# Patient Record
Sex: FEMALE | Race: WHITE | HISPANIC OR LATINO | Employment: FULL TIME | URBAN - METROPOLITAN AREA
[De-identification: names, ages, dates, MRNs, and addresses within clinical notes are randomized per-mention and may not be internally consistent; named-entity substitution may affect disease eponyms.]

---

## 2018-05-20 ENCOUNTER — HOSPITAL ENCOUNTER (EMERGENCY)
Facility: HOSPITAL | Age: 28
Discharge: HOME | End: 2018-05-20
Attending: EMERGENCY MEDICINE

## 2018-05-20 VITALS
OXYGEN SATURATION: 98 % | HEART RATE: 90 BPM | HEIGHT: 68 IN | WEIGHT: 210 LBS | RESPIRATION RATE: 18 BRPM | DIASTOLIC BLOOD PRESSURE: 86 MMHG | TEMPERATURE: 99 F | BODY MASS INDEX: 31.83 KG/M2 | SYSTOLIC BLOOD PRESSURE: 138 MMHG

## 2018-05-20 DIAGNOSIS — J02.9 VIRAL PHARYNGITIS: ICD-10-CM

## 2018-05-20 DIAGNOSIS — K12.1 MOUTH ULCERS: Primary | ICD-10-CM

## 2018-05-20 LAB
B-HCG UR QL: NEGATIVE
CTP QC/QA: YES
DEPRECATED S PYO AG THROAT QL EIA: NEGATIVE

## 2018-05-20 PROCEDURE — 99283 EMERGENCY DEPT VISIT LOW MDM: CPT

## 2018-05-20 PROCEDURE — 87880 STREP A ASSAY W/OPTIC: CPT

## 2018-05-20 PROCEDURE — 81025 URINE PREGNANCY TEST: CPT | Performed by: NURSE PRACTITIONER

## 2018-05-20 PROCEDURE — 87081 CULTURE SCREEN ONLY: CPT

## 2018-05-20 PROCEDURE — 25000003 PHARM REV CODE 250: Performed by: NURSE PRACTITIONER

## 2018-05-20 RX ORDER — ACETAMINOPHEN 325 MG/1
650 TABLET ORAL
Status: COMPLETED | OUTPATIENT
Start: 2018-05-20 | End: 2018-05-20

## 2018-05-20 RX ADMIN — ACETAMINOPHEN 650 MG: 325 TABLET ORAL at 03:05

## 2018-05-20 RX ADMIN — LIDOCAINE HYDROCHLORIDE 15 ML: 20 SOLUTION ORAL; TOPICAL at 03:05

## 2018-05-20 NOTE — ED NOTES
I have reviewed the provider's instructions with the patient, answering all questions to her satisfaction

## 2018-05-20 NOTE — DISCHARGE INSTRUCTIONS
Please return to the Emergency Department for any new or worsening symptoms including: difficulty or unable to swallow, fever, chest pain, shortness of breath, loss of consciousness, dizziness, weakness, or any other concerns.     Please follow up with your Primary Care Provider within in the week. If you do not have one, you may contact the one listed on your discharge paperwork or you may also call the [facility] Appointment Desk at [phone] to schedule an appointment with one.     Please take all medication as prescribed. Tylenol or Ibuprofen as needed for pain.

## 2018-05-20 NOTE — ED PROVIDER NOTES
Encounter Date: 5/20/2018    SCRIBE #1 NOTE: I, Tammy Bentley, am scribing for, and in the presence of,  SERG Shepherd. I have scribed the following portions of the note - Other sections scribed: ROS and HPI.       History     Chief Complaint   Patient presents with    Sore     pt. states that she has been having mouth sores for that pass 3 days. noted inside of pt. right bottom gum are small sores. pt. c/o pain to her right cheek and mouth     CC: Mouth Sores    HPI: This 27 y.o. female with a PMHx of Sinusitis, presents to the ED complaining of acute onset of painful sores to her mouth and sore throat  for last 3 days.  Denies cough or any other associated sx. No known sick exposures. Took Advil for her sx. No prior similar episodes. Sx are moderate and constant.       The history is provided by the patient. No  was used.     Review of patient's allergies indicates:  No Known Allergies  History reviewed. No pertinent past medical history.  History reviewed. No pertinent surgical history.  History reviewed. No pertinent family history.  Social History   Substance Use Topics    Smoking status: Never Smoker    Smokeless tobacco: Never Used    Alcohol use No     Review of Systems   Constitutional: Negative for fever.   HENT: Positive for mouth sores (R side) and sore throat. Negative for congestion, drooling and trouble swallowing.    Respiratory: Negative for cough and shortness of breath.    Cardiovascular: Negative for chest pain.   Gastrointestinal: Negative for abdominal pain, nausea and vomiting.   Musculoskeletal: Negative for neck pain and neck stiffness.   Skin: Negative for rash and wound.   Psychiatric/Behavioral: Negative for confusion.       Physical Exam     Initial Vitals [05/20/18 1422]   BP Pulse Resp Temp SpO2   (!) 143/95 98 18 98.6 °F (37 °C) 100 %      MAP       111         Physical Exam    Nursing note and vitals reviewed.  Constitutional: She appears well-developed and  well-nourished. She is not diaphoretic. She is cooperative.  Non-toxic appearance. No distress.   HENT:   Head: Normocephalic and atraumatic.   Right Ear: Tympanic membrane and external ear normal. Tympanic membrane is not erythematous.   Left Ear: Tympanic membrane and external ear normal. Tympanic membrane is not erythematous.   Nose: Nose normal.   Mouth/Throat: Uvula is midline. Mucous membranes are not pale, not dry and not cyanotic. Oral lesions (x2) present. No trismus in the jaw. No uvula swelling. No posterior oropharyngeal edema.       Bilateral tonsillar erythema and exudates. Mild tonsillar erythema.   Eyes: Conjunctivae and EOM are normal.   Neck: Full passive range of motion without pain and phonation normal. Neck supple. No tracheal deviation and normal range of motion present. No neck rigidity.   Cardiovascular: Normal rate, regular rhythm and intact distal pulses.   Pulses:       Radial pulses are 2+ on the right side, and 2+ on the left side.   Pulmonary/Chest: Effort normal and breath sounds normal. No stridor. No tachypnea and no bradypnea. No respiratory distress. She has no wheezes. She has no rhonchi. She has no rales.   Musculoskeletal: Normal range of motion.   Lymphadenopathy:     She has no cervical adenopathy.        Right cervical: No superficial cervical adenopathy present.       Left cervical: No superficial cervical adenopathy present.   Neurological: She is alert and oriented to person, place, and time. She has normal strength. No sensory deficit. Coordination normal. GCS eye subscore is 4. GCS verbal subscore is 5. GCS motor subscore is 6.   Skin: Skin is warm, dry and intact. Capillary refill takes less than 2 seconds. No rash noted. No cyanosis or erythema. Nails show no clubbing.   Psychiatric: She has a normal mood and affect. Her behavior is normal. Judgment and thought content normal.         ED Course   Procedures  Labs Reviewed   THROAT SCREEN, RAPID   CULTURE, STREP A,   THROAT   POCT URINE PREGNANCY                   APC / Resident Notes:   This is an evaluation of a 27 y.o. female that presents to the Emergency Department for mouth sores and sore throat. Physical Exam shows a non-toxic, afebrile, and well appearing female.  Breath sounds clear and equal.  Neck is soft with no cervical lymphadenopathy or meningeal signs. Mild bilateral tonsillar erythema with exudate present midline uvula. No evidence PTA.  To 0.5 cm ulcerative lesions to the left and right buccal surface where the upper and lower posterior molars meet.  No evidence of underlying abscess.  Sublingual spaces are soft. Vital Signs Are Reassuring. If available, previous records reviewed. RESULTS:  Given the presence of the oral sores with a sore throat, believe this is likely a viral pharyngitis.  Culture is pending.    My overall impression is viral pharyngitis and mouth sores. I considered, but at this time, do not suspect OM, OE, meningitis, pneumonia, deep neck space infection, PTA.     ED Course:  Magic mouthwash and Tylenol. D/C Meds:  Magic mouthwash. D/C Information:  Warm salt water gargles, Orajel. The diagnosis, treatment plan, instructions for follow-up and reevaluation with PCP as well as ED return precautions were discussed and understanding was verbalized. All questions or concerns have been addressed. This case was discussed with Dr. Lyons who is in agreement with my assessment and plan. FUAD Cervantes, SERG-C        Scribe Attestation:   Scribe #1: I performed the above scribed service and the documentation accurately describes the services I performed. I attest to the accuracy of the note.    Attending Attestation:           Physician Attestation for Scribe:  Physician Attestation Statement for Scribe #1: I, SERG Shepherd, reviewed documentation, as scribed by Tammy Bentley in my presence, and it is both accurate and complete.                    Clinical Impression:   The primary encounter  diagnosis was Mouth ulcers. A diagnosis of Viral pharyngitis was also pertinent to this visit.    Disposition:   Disposition: Discharged  Condition: Stable                        Stewart Benoit, [facility]  05/20/18 1531

## 2018-05-22 LAB — BACTERIA THROAT CULT: NORMAL
